# Patient Record
Sex: MALE | Race: OTHER | NOT HISPANIC OR LATINO | URBAN - METROPOLITAN AREA
[De-identification: names, ages, dates, MRNs, and addresses within clinical notes are randomized per-mention and may not be internally consistent; named-entity substitution may affect disease eponyms.]

---

## 2018-02-03 ENCOUNTER — EMERGENCY (EMERGENCY)
Facility: HOSPITAL | Age: 28
LOS: 1 days | Discharge: HOME | End: 2018-02-03
Attending: EMERGENCY MEDICINE
Payer: COMMERCIAL

## 2018-02-03 VITALS
HEART RATE: 78 BPM | DIASTOLIC BLOOD PRESSURE: 78 MMHG | HEIGHT: 76 IN | RESPIRATION RATE: 16 BRPM | OXYGEN SATURATION: 99 % | SYSTOLIC BLOOD PRESSURE: 150 MMHG | WEIGHT: 179.9 LBS | TEMPERATURE: 98 F

## 2018-02-03 VITALS
RESPIRATION RATE: 16 BRPM | OXYGEN SATURATION: 99 % | DIASTOLIC BLOOD PRESSURE: 71 MMHG | HEART RATE: 64 BPM | SYSTOLIC BLOOD PRESSURE: 155 MMHG

## 2018-02-03 RX ORDER — IBUPROFEN 200 MG
600 TABLET ORAL ONCE
Qty: 0 | Refills: 0 | Status: COMPLETED | OUTPATIENT
Start: 2018-02-03 | End: 2018-02-03

## 2018-02-03 RX ADMIN — Medication 600 MILLIGRAM(S): at 12:24

## 2018-02-03 RX ADMIN — Medication 600 MILLIGRAM(S): at 13:25

## 2018-02-03 NOTE — ED ADULT NURSE NOTE - OBJECTIVE STATEMENT
C/O LEFT KNEE PAIN WHILE PLAYING SOFTBALL AT 3:00 AM THIS MORNING. C/O LEFT KNEE PAIN WHILE PLAYING SOFTBALL AT 3:00 AM THIS MORNING.AMBULATING WITH STEADY GAIT.

## 2018-02-03 NOTE — ED PROVIDER NOTE - MEDICAL DECISION MAKING DETAILS
Pt well appearing. Sx suggestive of most likely ligamentous injury y vs contusion. Pt has full ROM. Will X-ray, give Ibuprofen and most likely follow up w/ ortho.

## 2018-02-03 NOTE — ED PROVIDER NOTE - OBJECTIVE STATEMENT
28 y/o M pt w/ no significant PMHx presents to the ED c/o L knee pain sustained after playing softball yesterday. No interventions prior to arrival. no previous injuries. Confirms antalgic gait. Denies numbness/tingling, weakness or any other complaints.

## 2018-02-03 NOTE — ED PROVIDER NOTE - ATTENDING CONTRIBUTION TO CARE
28 y/o M c/o L knee pain sustained after playing softball yesterday. PE reveals (-) ant/post drawer test, (-) [employer]. Pt walks with antalgic gait. Knee XR (-). Instructed to f/u with ortho as outpt for further eval and management.